# Patient Record
Sex: MALE | Race: WHITE
[De-identification: names, ages, dates, MRNs, and addresses within clinical notes are randomized per-mention and may not be internally consistent; named-entity substitution may affect disease eponyms.]

---

## 2018-01-27 ENCOUNTER — HOSPITAL ENCOUNTER (EMERGENCY)
Dept: HOSPITAL 17 - NEPE | Age: 58
Discharge: HOME | End: 2018-01-27
Payer: SELF-PAY

## 2018-01-27 VITALS — SYSTOLIC BLOOD PRESSURE: 151 MMHG | DIASTOLIC BLOOD PRESSURE: 81 MMHG

## 2018-01-27 VITALS
OXYGEN SATURATION: 99 % | DIASTOLIC BLOOD PRESSURE: 87 MMHG | TEMPERATURE: 98 F | SYSTOLIC BLOOD PRESSURE: 186 MMHG | RESPIRATION RATE: 17 BRPM | HEART RATE: 72 BPM

## 2018-01-27 VITALS
DIASTOLIC BLOOD PRESSURE: 100 MMHG | HEART RATE: 81 BPM | RESPIRATION RATE: 16 BRPM | OXYGEN SATURATION: 93 % | SYSTOLIC BLOOD PRESSURE: 171 MMHG

## 2018-01-27 DIAGNOSIS — K57.30: ICD-10-CM

## 2018-01-27 DIAGNOSIS — F17.200: ICD-10-CM

## 2018-01-27 DIAGNOSIS — B96.29: ICD-10-CM

## 2018-01-27 DIAGNOSIS — I25.10: ICD-10-CM

## 2018-01-27 DIAGNOSIS — I10: ICD-10-CM

## 2018-01-27 DIAGNOSIS — N45.3: Primary | ICD-10-CM

## 2018-01-27 LAB
BACTERIA #/AREA URNS HPF: (no result) /HPF
BASOPHILS # BLD AUTO: 0.1 TH/MM3 (ref 0–0.2)
BASOPHILS NFR BLD: 0.6 % (ref 0–2)
BUN SERPL-MCNC: 8 MG/DL (ref 7–18)
CALCIUM SERPL-MCNC: 9.3 MG/DL (ref 8.5–10.1)
CHLORIDE SERPL-SCNC: 97 MEQ/L (ref 98–107)
COLOR UR: YELLOW
CREAT SERPL-MCNC: 0.94 MG/DL (ref 0.6–1.3)
EOSINOPHIL # BLD: 0 TH/MM3 (ref 0–0.4)
EOSINOPHIL NFR BLD: 0.2 % (ref 0–4)
ERYTHROCYTE [DISTWIDTH] IN BLOOD BY AUTOMATED COUNT: 12.9 % (ref 11.6–17.2)
GFR SERPLBLD BASED ON 1.73 SQ M-ARVRAT: 82 ML/MIN (ref 89–?)
GLUCOSE SERPL-MCNC: 92 MG/DL (ref 74–106)
GLUCOSE UR STRIP-MCNC: (no result) MG/DL
HCO3 BLD-SCNC: 25.6 MEQ/L (ref 21–32)
HCT VFR BLD CALC: 40 % (ref 39–51)
HGB BLD-MCNC: 13.7 GM/DL (ref 13–17)
HGB UR QL STRIP: (no result)
HYALINE CASTS #/AREA URNS LPF: 1 /LPF
INR PPP: 1 RATIO
KETONES UR STRIP-MCNC: 10 MG/DL
LYMPHOCYTES # BLD AUTO: 0.4 TH/MM3 (ref 1–4.8)
LYMPHOCYTES NFR BLD AUTO: 2.8 % (ref 9–44)
MCH RBC QN AUTO: 34.3 PG (ref 27–34)
MCHC RBC AUTO-ENTMCNC: 34.4 % (ref 32–36)
MCV RBC AUTO: 99.7 FL (ref 80–100)
MONOCYTE #: 1 TH/MM3 (ref 0–0.9)
MONOCYTES NFR BLD: 6.5 % (ref 0–8)
MUCOUS THREADS #/AREA URNS LPF: (no result) /LPF
NEUTROPHILS # BLD AUTO: 13.7 TH/MM3 (ref 1.8–7.7)
NEUTROPHILS NFR BLD AUTO: 89.9 % (ref 16–70)
NITRITE UR QL STRIP: (no result)
PLATELET # BLD: 153 TH/MM3 (ref 150–450)
PMV BLD AUTO: 10.8 FL (ref 7–11)
PROTHROMBIN TIME: 10.2 SEC (ref 9.8–11.6)
RBC # BLD AUTO: 4.01 MIL/MM3 (ref 4.5–5.9)
SODIUM SERPL-SCNC: 132 MEQ/L (ref 136–145)
SP GR UR STRIP: 1.01 (ref 1–1.03)
URINE LEUKOCYTE ESTERASE: (no result)
WBC # BLD AUTO: 15.3 TH/MM3 (ref 4–11)

## 2018-01-27 PROCEDURE — 85610 PROTHROMBIN TIME: CPT

## 2018-01-27 PROCEDURE — 96365 THER/PROPH/DIAG IV INF INIT: CPT

## 2018-01-27 PROCEDURE — 96375 TX/PRO/DX INJ NEW DRUG ADDON: CPT

## 2018-01-27 PROCEDURE — 74177 CT ABD & PELVIS W/CONTRAST: CPT

## 2018-01-27 PROCEDURE — 87186 SC STD MICRODIL/AGAR DIL: CPT

## 2018-01-27 PROCEDURE — 76870 US EXAM SCROTUM: CPT

## 2018-01-27 PROCEDURE — 96376 TX/PRO/DX INJ SAME DRUG ADON: CPT

## 2018-01-27 PROCEDURE — 93975 VASCULAR STUDY: CPT

## 2018-01-27 PROCEDURE — 80048 BASIC METABOLIC PNL TOTAL CA: CPT

## 2018-01-27 PROCEDURE — 96361 HYDRATE IV INFUSION ADD-ON: CPT

## 2018-01-27 PROCEDURE — 99285 EMERGENCY DEPT VISIT HI MDM: CPT

## 2018-01-27 PROCEDURE — 87591 N.GONORRHOEAE DNA AMP PROB: CPT

## 2018-01-27 PROCEDURE — 85730 THROMBOPLASTIN TIME PARTIAL: CPT

## 2018-01-27 PROCEDURE — 87086 URINE CULTURE/COLONY COUNT: CPT

## 2018-01-27 PROCEDURE — 85025 COMPLETE CBC W/AUTO DIFF WBC: CPT

## 2018-01-27 PROCEDURE — 87491 CHLMYD TRACH DNA AMP PROBE: CPT

## 2018-01-27 PROCEDURE — 87077 CULTURE AEROBIC IDENTIFY: CPT

## 2018-01-27 PROCEDURE — 81001 URINALYSIS AUTO W/SCOPE: CPT

## 2018-01-27 NOTE — RADRPT
EXAM DATE/TIME:  01/27/2018 17:37 

 

[facility] COMPARISON:     

No previous studies available for comparison.

        

 

 

INDICATIONS :     

Testicular pain.

                     

 

MEDICAL HISTORY :        

Hypercholesterolemia. Gastroesophageal reflux disease. Coronary artery disease. Hypertension. 

 

SURGICAL HISTORY :     

None. 

 

ENCOUNTER:      

Initial

 

ACUITY:      

1 day

 

PAIN SCORE:      

5/10

 

LOCATION:      

Bilateral  testicles. 

                     

 

MEASUREMENTS:     

 

RIGHT TESTICLE:        

3.8 x 2.7 x 1.8cm     

 

LEFT TESTICLE:        

4.8 x 3.3 x 2.7cm     

 

FINDINGS:     

 

RIGHT TESTICLE:     

Homogeneous echotexture without intra or extratesticular mass.  Blood flow is symmetric and within no
rmal limits.  No hydrocele or varicocele.  3 mm epididymal head cyst. Epididymis is otherwise within 
normal limits.  

 

LEFT TESTICLE:     

Slightly heterogeneous testicular echotexture. Swollen epididymis with hyperemia. Mild hyperemia of t
he testis itself. There is a moderate left hydrocele.

 

SCROTUM:     

Within normal limits.  

 

CONCLUSION:     

1. Suspected left epididymitis and/or epididymoorchitis in the proper clinical setting.

2. Moderate left hydrocele, presumably reactive.

3. Tiny spermatocele on the right incidentally noted.

4. No evidence of torsion.

 

 

 

 Frank Olivas MD on January 27, 2018 at 18:23           

Board Certified Radiologist.

 This report was verified electronically.

## 2018-01-27 NOTE — PD
HPI


Chief Complaint:   Complaint


Time Seen by Provider:  17:18


Travel History


International Travel<30 days:  No


Contact w/Intl Traveler<30days:  No


Traveled to known affect area:  No





History of Present Illness


HPI


57-year-old male that presents to the ED for evaluation of left groin pain that 

radiates to the back as well as to the left leg.  Per patient she's had this 

for about 2 days.  Per patient he stated yesterday he was in severe until 

today.  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  

Will he was doing heavy lifting today before finishing work the pain became 

more bearable and he decided to get evaluated.  Per patient he contacted one of 

his friends were apparently is a doctor recommended that he gets evaluated for 

possible kidney stone versus cauda equina.  Patient denies any bowel movement 

or urinary issues.  He denies any history of anything like this before.  

History of hernias.  No injuries.  Patient does do a lot of heavy lifting for a 

living.  States that the pain is currently say out of 10.  He has not taken 

anything for this.  He denies any trauma.  He denies any numbness, tilling, 

weakness to the legs or arms.  No previous injuries or surgeries to the back or 

neck.  No allergies to medication.  No other medical issues.





PFSH


Past Medical History


High Cholesterol:  Yes


Coronary Artery Disease:  Yes


GERD:  Yes


Hypertension:  Yes


Pregnant?:  Not Pregnant





Social History


Alcohol Use:  Yes (SOCIAL)


Tobacco Use:  Yes (1.5 PPD)


Substance Use:  No





Allergies-Medications


(Allergen,Severity, Reaction):  


Coded Allergies:  


     No Known Allergies (Verified  Adverse Reaction, Unknown, 1/27/18)


Reported Meds & Prescriptions





Reported Meds & Active Scripts


Active


Levaquin (Levofloxacin) 500 Mg Tablet 500 Mg PO DAILY 10 Days


Ibuprofen 800 Mg Tab 800 Mg PO Q8H PRN


Percocet (Oxycodone-Acetaminophen) 5-325 mg Tab 1 Tab PO Q6H PRN


Reported


Hydrocodone-Acetaminophen 7.5 Mg-325 Mg Tab 1 Tab PO Q8HR PRN


Pantoprazole (Pantoprazole Sodium) 40 Mg Tab 40 Mg PO DAILY


Aspirin 81 Mg Chew 81 Mg PO DAILY


Atenolol 50 Mg Tab 50 Mg PO DAILY


Norvasc (Amlodipine Besylate) 5 Mg Tab 5 Mg PO DAILY


Lisinopril 10 Mg Tab 10 Mg PO DAILY








Review of Systems


Except as stated in HPI:  all other systems reviewed are Neg





Physical Exam


Narrative


GENERAL: 


SKIN: Warm and dry.


HEAD: Atraumatic. Normocephalic. 


EYES: Pupils equal and round. No scleral icterus. No injection or drainage. 


ENT: No nasal bleeding or discharge.  Mucous membranes pink and moist.  Tongue 

is midline.  No uvula deviation.


NECK: Trachea midline. No JVD. 


CARDIOVASCULAR: Regular rate and rhythm.  No murmurs, S3, S4.


RESPIRATORY: No accessory muscle use. Clear to auscultation. Breath sounds 

equal bilaterally. 


GASTROINTESTINAL: Abdomen soft, non-tender, nondistended. Hepatic and splenic 

margins not palpable. 


MUSCULOSKELETAL: Extremities without clubbing, cyanosis, or edema. No obvious 

deformities.  Tender to palpation on the left testicle with swelling noted.  

Tender to touch in this area.  No other pain is reproducible with touch.  

Straight leg test negative.  Pronator test negative.  No lumbar, thoracic, 

cervical spine tenderness to palpation.


NEUROLOGICAL: Awake and alert. No obvious cranial nerve deficits.  Motor 

grossly within normal limits. Five out of 5 muscle strength in the arms and 

legs.  Normal speech.


PSYCHIATRIC: Appropriate mood and affect; insight and judgment normal.





Data


Data


Last Documented VS





Vital Signs








  Date Time  Temp Pulse Resp B/P (MAP) Pulse Ox O2 Delivery O2 Flow Rate FiO2


 


1/27/18 19:38  81 16 171/100 (123) 93 Room Air  


 


1/27/18 17:30 98.0       








Orders





 Orders


Complete Blood Count With Diff (1/27/18 17:25)


Basic Metabolic Panel (Bmp) (1/27/18 17:25)


Prothrombin Time / Inr (Pt) (1/27/18 17:25)


Act Partial Throm Time (Ptt) (1/27/18 17:25)


Urinalysis - C+S If Indicated (1/27/18 17:25)


Ct Abd/Pel W Iv Contrast(Rout) (1/27/18 17:25)


Iv Access Insert/Monitor (1/27/18 17:25)


Ecg Monitoring (1/27/18 17:25)


Oximetry (1/27/18 17:25)


Morphine Inj (Morphine Inj) (1/27/18 17:30)


Ondansetron Inj (Zofran Inj) (1/27/18 17:30)


Us Testicles W Doppler (1/27/18 17:36)


Gc And Chlamydia Pcr (1/27/18 18:22)


Sodium Chlor 0.9% 1000 Ml Inj (Ns 1000 M (1/27/18 19:00)


Ceftriaxone Inj (Rocephin Inj) (1/27/18 19:00)


Urine Culture (1/27/18 18:50)


Iohexol 350 Inj (Omnipaque 350 Inj) (1/27/18 19:19)


Levofloxacin 500 Mg Premix Inj (Levaquin (1/27/18 20:00)


Ketorolac Inj (Toradol Inj) (1/27/18 20:00)


Morphine Inj (Morphine Inj) (1/27/18 20:00)





Labs





Laboratory Tests








Test


  1/27/18


17:30 1/27/18


18:50


 


White Blood Count 15.3 TH/MM3  


 


Red Blood Count 4.01 MIL/MM3  


 


Hemoglobin 13.7 GM/DL  


 


Hematocrit 40.0 %  


 


Mean Corpuscular Volume 99.7 FL  


 


Mean Corpuscular Hemoglobin 34.3 PG  


 


Mean Corpuscular Hemoglobin


Concent 34.4 % 


  


 


 


Red Cell Distribution Width 12.9 %  


 


Platelet Count 153 TH/MM3  


 


Mean Platelet Volume 10.8 FL  


 


Neutrophils (%) (Auto) 89.9 %  


 


Lymphocytes (%) (Auto) 2.8 %  


 


Monocytes (%) (Auto) 6.5 %  


 


Eosinophils (%) (Auto) 0.2 %  


 


Basophils (%) (Auto) 0.6 %  


 


Neutrophils # (Auto) 13.7 TH/MM3  


 


Lymphocytes # (Auto) 0.4 TH/MM3  


 


Monocytes # (Auto) 1.0 TH/MM3  


 


Eosinophils # (Auto) 0.0 TH/MM3  


 


Basophils # (Auto) 0.1 TH/MM3  


 


CBC Comment DIFF FINAL  


 


Differential Comment   


 


Prothrombin Time 10.2 SEC  


 


Prothromb Time International


Ratio 1.0 RATIO 


  


 


 


Activated Partial


Thromboplast Time 26.3 SEC 


  


 


 


Blood Urea Nitrogen 8 MG/DL  


 


Creatinine 0.94 MG/DL  


 


Random Glucose 92 MG/DL  


 


Calcium Level 9.3 MG/DL  


 


Sodium Level 132 MEQ/L  


 


Potassium Level 4.1 MEQ/L  


 


Chloride Level 97 MEQ/L  


 


Carbon Dioxide Level 25.6 MEQ/L  


 


Anion Gap 9 MEQ/L  


 


Estimat Glomerular Filtration


Rate 82 ML/MIN 


  


 


 


Urine Color  YELLOW 


 


Urine Turbidity  HAZY 


 


Urine pH  5.0 


 


Urine Specific Gravity  1.012 


 


Urine Protein  NEG mg/dL 


 


Urine Glucose (UA)  NEG mg/dL 


 


Urine Ketones  10 mg/dL 


 


Urine Occult Blood  MOD 


 


Urine Nitrite  POS 


 


Urine Bilirubin  NEG 


 


Urine Urobilinogen


  


  LESS THAN 2.0


MG/DL


 


Urine Leukocyte Esterase  LARGE 


 


Urine RBC  11 /hpf 


 


Urine WBC  90 /hpf 


 


Urine Bacteria  OCC /hpf 


 


Urine Hyaline Casts  1 /lpf 


 


Urine Mucus  FEW /lpf 


 


Microscopic Urinalysis Comment


  


  CULTURE


INDICATED











MDM


Medical Decision Making


Medical Screen Exam Complete:  Yes


Emergency Medical Condition:  Yes


Medical Record Reviewed:  Yes


Interpretation(s)





Last Impressions








Scrotum Ultrasound 1/27/18 1736 Signed





Impressions: 





 Service Date/Time:  Saturday, January 27, 2018 17:37 - CONCLUSION:  1. 

Suspected 





 left epididymitis and/or epididymoorchitis in the proper clinical setting. 2. 





 Moderate left hydrocele, presumably reactive. 3. Tiny spermatocele on the 

right 





 incidentally noted. 4. No evidence of torsion.     Frank Olivas MD 


 


Abdomen/Pelvis CT 1/27/18 1725 Signed





Impressions: 





 Service Date/Time:  Saturday, January 27, 2018 19:12 - CONCLUSION:  1. No 

acute 





 abnormality demonstrated. 2. Sigmoid colon diverticulosis. No diverticulitis. 

   





  Frank Olivas MD 





CBC & BMP Diagram


1/27/18 17:30








Calcium Level 9.3





UA shows UTI


Differential Diagnosis


Epididymitis versus testicular torsion versus compression fracture versus 

hernia versus groin muscle pull versus normal exam versus UTI


Narrative Course


57-year-old male that presents to the ED for evaluation of left testicular pain 

and left leg pain.  Patient was properly examined and was found to have signs 

and symptoms of unclear etiology at this time but concerning for testicular 

torsion versus hernia.  He recommended labs and imaging.  Do not see any signs 

of cauda equina as patient has no signs of neurological deficits and no bowel 

movement or urinary issues. No saddle anesthesia.  just pain.  Stat ultrasound 

as well as labs and imaging were ordered.  Patient was given IV pain 

medications.  Case was discussed in my attending Dr. Marshall who agrees with plan.

  Labs and imaging show what appears to be epididymitis.  Urine did show 

significant infection.  Case was discussed in my attending agrees the patient 

can be discharged home with pain management and antibiotics.  Patient was 

started on Levaquin here in the ED.  He was given another dose of pain 

medication as well as anti-inflammatories.  He will be sent home with 

prescriptions for Percocet, ibuprofen and Levaquin.  Instructions to follow up 

the next 48 hours with PCP.  See ED for any worsening symptoms.  Patient was 

sent reasons to come back.  Close follow with PCP.





Diagnosis





 Primary Impression:  


 Epididymitis, left


Patient Instructions:  General Instructions, Narcotic given in the ED





***Additional Instructions:  


Take medications as prescribed.


Follow-up with PCP.


See ED for any worsening symptoms.


Do not drink or drive while taking pain medication.


Apply ice or heat as needed for pain


***Med/Other Pt SpecificInfo:  Prescription(s) given


Scripts


Levofloxacin (Levaquin) 500 Mg Tablet


500 MG PO DAILY for Infection for 10 Days, #10 TAB 0 Refills


   Prov: Mark Babin MD         1/27/18 


Ibuprofen (Ibuprofen) 800 Mg Tab


800 MG PO Q8H Y for PAIN SCALE 1 TO 10, #20 TAB 0 Refills


   Prov: Mark Babin MD         1/27/18 


Oxycodone-Acetaminophen (Percocet) 5-325 mg Tab


1 TAB PO Q6H Y for PAIN, #12 TAB 0 Refills


   Prov: Mark Babin MD         1/27/18


Disposition:  01 DISCHARGE HOME


Condition:  Stable











Frandy Chin Jan 27, 2018 18:27

## 2018-01-27 NOTE — RADRPT
EXAM DATE/TIME:  01/27/2018 19:12 

 

[facility] COMPARISON:     

No previous studies available for comparison.

 

 

INDICATIONS :     

Left groin and flank pain.

                      

 

IV CONTRAST:     

96 cc Omnipaque 350 (iohexol) IV 

 

 

ORAL CONTRAST:      

No oral contrast ingested.

                      

 

RADIATION DOSE:     

5.76 CTDIvol (mGy) 

 

 

MEDICAL HISTORY :     

Hypertension. Gastroesophageal reflux disease. 

 

SURGICAL HISTORY :      

None. 

 

ENCOUNTER:      

Initial

 

ACUITY:      

1 day

 

PAIN SCALE:      

6/10

 

LOCATION:       

Left flank 

 

TECHNIQUE:     

Volumetric scanning of the abdomen and pelvis was performed.  Using automated exposure control and ad
justment of the mA and/or kV according to patient size, radiation dose was kept as low as reasonably 
achievable to obtain optimal diagnostic quality images.  DICOM format image data is available electro
nically for review and comparison.  

 

FINDINGS:     

 

LOWER LUNGS:     

The visualized lower lungs are clear. Suspected emphysema.

 

LIVER:     

9 mm hypodensity inferiorly the right hepatic lobe, likely a cyst or other benign structure.  There i
s no dilation of the biliary tree.  No calcified gallstones.

 

SPLEEN:     

Normal size without lesion.

 

PANCREAS:     

Within normal limits.

 

KIDNEYS:     

Normal in size and shape.  There is no mass, stone or hydronephrosis.

 

ADRENAL GLANDS:     

Within normal limits.

 

VASCULAR:     

There is no aortic aneurysm.

 

BOWEL/MESENTERY:     

The stomach, small bowel, and colon demonstrate no acute abnormality.  There is no free intraperitone
al air or fluid. Scattered diverticula of the sigmoid colon. Normal appendix.

 

ABDOMINAL WALL:     

Within normal limits.

 

RETROPERITONEUM:     

There is no lymphadenopathy. 

 

BLADDER:     

No wall thickening or mass. 

 

REPRODUCTIVE:     

Within normal limits.

 

INGUINAL:     

There is no lymphadenopathy or hernia. 

 

MUSCULOSKELETAL:     

Degenerative changes of the spine. No acute bony abnormality demonstrated.

 

CONCLUSION:     

1. No acute abnormality demonstrated.

2. Sigmoid colon diverticulosis. No diverticulitis.

 

 

 

 Frank Olivas MD on January 27, 2018 at 19:39           

Board Certified Radiologist.

 This report was verified electronically.

## 2018-03-16 ENCOUNTER — HOSPITAL ENCOUNTER (EMERGENCY)
Dept: HOSPITAL 17 - PHED | Age: 58
Discharge: HOME | End: 2018-03-16
Payer: COMMERCIAL

## 2018-03-16 VITALS
DIASTOLIC BLOOD PRESSURE: 88 MMHG | OXYGEN SATURATION: 92 % | SYSTOLIC BLOOD PRESSURE: 162 MMHG | RESPIRATION RATE: 18 BRPM | HEART RATE: 56 BPM

## 2018-03-16 VITALS
RESPIRATION RATE: 17 BRPM | SYSTOLIC BLOOD PRESSURE: 162 MMHG | HEART RATE: 56 BPM | OXYGEN SATURATION: 97 % | TEMPERATURE: 98.9 F | DIASTOLIC BLOOD PRESSURE: 77 MMHG

## 2018-03-16 VITALS — HEIGHT: 71 IN | WEIGHT: 169.76 LBS | BODY MASS INDEX: 23.77 KG/M2

## 2018-03-16 DIAGNOSIS — N49.2: Primary | ICD-10-CM

## 2018-03-16 DIAGNOSIS — I10: ICD-10-CM

## 2018-03-16 DIAGNOSIS — K21.9: ICD-10-CM

## 2018-03-16 DIAGNOSIS — F17.200: ICD-10-CM

## 2018-03-16 LAB
AMORPHOUS SEDIMENT, URINE: (no result)
COLOR UR: YELLOW
GLUCOSE UR STRIP-MCNC: (no result) MG/DL
HGB UR QL STRIP: (no result)
KETONES UR STRIP-MCNC: (no result) MG/DL
NITRITE UR QL STRIP: (no result)
SP GR UR STRIP: (no result) (ref 1–1.03)
SQUAMOUS #/AREA URNS HPF: (no result) /HPF (ref 0–5)
URINE LEUKOCYTE ESTERASE: (no result)

## 2018-03-16 PROCEDURE — 81001 URINALYSIS AUTO W/SCOPE: CPT

## 2018-03-16 PROCEDURE — 87205 SMEAR GRAM STAIN: CPT

## 2018-03-16 PROCEDURE — 87070 CULTURE OTHR SPECIMN AEROBIC: CPT

## 2018-03-16 PROCEDURE — 96372 THER/PROPH/DIAG INJ SC/IM: CPT

## 2018-03-16 PROCEDURE — 87077 CULTURE AEROBIC IDENTIFY: CPT

## 2018-03-16 PROCEDURE — 86403 PARTICLE AGGLUT ANTBDY SCRN: CPT

## 2018-03-16 NOTE — PD
HPI


Chief Complaint:   Complaint


Time Seen by Provider:  13:00


Travel History


International Travel<30 days:  No


Contact w/Intl Traveler<30days:  No


Traveled to known affect area:  No





History of Present Illness


HPI


57-year-old male complains of pain and swelling and discharge from the left 

side of the scrotum.  Patient states that he has pain and swelling on left 

scrotum about 2 months ago.  Patient was seen by personal physician and 

urologist.  Patient was put on antibiotic.  Last antibiotic was Levaquin.  

Patient states that he has increasing pain and swelling and discharge when the 

scrotum  for the past 3 days.  Patient denies any fever chills.  Patient denies 

any dysuria or frequency.





PFSH


Past Medical History


GERD:  Yes


Hypertension:  Yes


Tetanus Vaccination:  > 5 Years


Influenza Vaccination:  No





Past Surgical History


Surgical History:  No Previous Surgery





Social History


Alcohol Use:  Yes (6-8 beers/day)


Tobacco Use:  Yes (2 PPD)


Substance Use:  No





Allergies-Medications


(Allergen,Severity, Reaction):  


Coded Allergies:  


     No Known Allergies (Unverified , 3/16/18)


Reported Meds & Prescriptions





Reported Meds & Active Scripts


Active


Reported


Aspirin Low Dose (Aspirin) 81 Mg Chew 81 Mg CHEW DAILY


Hydrocodone-Acetaminophen 7.5-300 Mg Tab 1 Tab PO TID


Pantoprazole (Pantoprazole Sodium) 40 Mg Tab 40 Mg PO DAILY


Atenolol 50 Mg Tab 50 Mg PO DAILY


Amlodipine (Amlodipine Besylate) 10 Mg Tab 10 Mg PO DAILY


Lisinopril 10 Mg Tab 10 Mg PO DAILY








Review of Systems


General / Constitutional:  No: Fever


Eyes:  No: Visual changes


HENT:  No: Headaches


Cardiovascular:  No: Chest Pain or Discomfort


Respiratory:  No: Shortness of Breath


Gastrointestinal:  No: Abdominal Pain


Genitourinary:  No: Dysuria


Musculoskeletal:  No: Pain


Skin:  No Rash


Neurologic:  No: Weakness


Psychiatric:  No: Depression


Endocrine:  No: Polydipsia


Hematologic/Lymphatic:  No: Easy Bruising





Physical Exam


Narrative


GENERAL: Well-nourished, well-developed patient.


SKIN: Focused skin assessment warm/dry.


HEAD: Normocephalic.


EYES: No scleral icterus. No injection or drainage. 


NECK: Supple, trachea midline. No JVD or lymphadenopathy.


CARDIOVASCULAR: Regular rate and rhythm without murmurs, gallops, or rubs. 


RESPIRATORY: Breath sounds equal bilaterally. No accessory muscle use.


GASTROINTESTINAL: Abdomen soft, non-tender, nondistended. 


MUSCULOSKELETAL: No cyanosis, or edema. 


BACK: Nontender without obvious deformity. No CVA tenderness.


 exam: Patient has redness and swelling and induration with an open lesion on 

the left scrotum.  Mild/moderate tenderness on palpation.  No urethral 

discharge or bleeding noted.





Data


Data


Last Documented VS





Vital Signs








  Date Time  Temp Pulse Resp B/P (MAP) Pulse Ox O2 Delivery O2 Flow Rate FiO2


 


3/16/18 11:55 98.9 56 17 162/77 (105) 97   








Orders





 Orders


Urinalysis - C+S If Indicated (3/16/18 12:12)


Clindamycin Inj (Cleocin Inj) (3/16/18 13:30)


Acetamin-Hydrocod 325-5 Mg (Norco  5-325 (3/16/18 13:30)


Wound Culture And Gram Stain (3/16/18 13:19)





Labs





Laboratory Tests








Test


  3/16/18


12:20


 


Urine Collection Type CLEAN CATCH 


 


Urine Color YELLOW 


 


Urine Turbidity CLEAR 


 


Urine pH 5.5 


 


Urine Specific Gravity


  LESS/EQUAL


1.005


 


Urine Protein NEG mg/dL 


 


Urine Glucose (UA) NEG mg/dL 


 


Urine Ketones NEG mg/dL 


 


Urine Occult Blood NEG 


 


Urine Nitrite NEG 


 


Urine Bilirubin NEG 


 


Urine Urobilinogen 0.2 MG/DL 


 


Urine Leukocyte Esterase NEG 


 


Urine Squamous Epithelial


Cells 0-5 /hpf 


 


 


Urine Amorphous Sediment FEW 


 


Microscopic Urinalysis Comment


  CULT NOT


INDICATED


 


Urine Collection Time 1220 











MDM


Medical Decision Making


Medical Screen Exam Complete:  Yes


Emergency Medical Condition:  Yes


Interpretation(s)


UA is negative.


Differential Diagnosis


Differential diagnosis including cellulitis, abscess.


Narrative Course


57-year-old male with redness swelling discharge left scrotum.  Patient has 

been seen by urologist and treated for UTI.  Clindamycin 600 mg IM.





Procedures


**Procedure Narrative**


The left scrotum was debrided.  Packing applied.  Dressing applied.





Diagnosis





 Primary Impression:  


 Scrotum, abscess


Patient Instructions:  General Instructions





***Additional Instructions:  


Take medications as directed.  Wound care daily.  Follow-up with urologist.


***Med/Other Pt SpecificInfo:  Prescription(s) given


Scripts


Tramadol (Ultram) 50 Mg Tab


50 MG PO Q6H Y for PAIN, #12 TAB 0 Refills


   Prov: Toro Soriano MD         3/16/18 


Clindamycin (Clindamycin) 150 Mg Cap


300 MG PO QID for Infection, #80 CAP 0 Refills


   Prov: Toro Soriano MD         3/16/18 


Sulfamethoxazole-Trimethoprim (Bactrim DS) 800-160 Mg Tab


1 TAB PO BID for Infection, #20 TAB 0 Refills


   Prov: Toro Soriano MD         3/16/18


Disposition:  01 DISCHARGE HOME


Condition:  Stable











Toro Soriano MD Mar 16, 2018 13:28